# Patient Record
(demographics unavailable — no encounter records)

---
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
## 2024-11-05 NOTE — IMAGING
[No bony abnormalities] : No bony abnormalities [No spinal deformity, fracture, lytic lesion, or marked single level collapse] : No spinal deformity, fracture, lytic lesion, or marked single level collapse [No instability seen on flexion/extension] : No instability seen on flexion/extension [AP] : anteroposterior [There are no fractures, subluxations or dislocations. No significant abnormalities are seen] : There are no fractures, subluxations or dislocations. No significant abnormalities are seen
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
## 2025-01-28 NOTE — HISTORY OF PRESENT ILLNESS
[Spouse] : spouse [FreeTextEntry1] : new pt establish care annual physical [de-identified] : Mr. DE LA TORRE is a 52- year-old M pmhx of bilateral kidney stone, elevated PSA, Osteopenia, [place] who presents at the office today to establish care. Pt reports of snoring in the night, says he may have a deviated septum. Overall, pt feels well. No complaints. Denies chest pain, SOB, STALLINGS, dizziness, diaphoresis, palpitations, LE swelling, orthopnea, syncope, n/v, headache.

## 2025-01-28 NOTE — HEALTH RISK ASSESSMENT
[0] : 2) Feeling down, depressed, or hopeless: Not at all (0) [PHQ-2 Negative - No further assessment needed] : PHQ-2 Negative - No further assessment needed [Never] : Never [[id number]] : 0

## 2025-01-28 NOTE — HEALTH RISK ASSESSMENT
[0] : 2) Feeling down, depressed, or hopeless: Not at all (0) [PHQ-2 Negative - No further assessment needed] : PHQ-2 Negative - No further assessment needed [Never] : Never [[id number]] : 0

## 2025-01-28 NOTE — ADDENDUM
[FreeTextEntry1] : This note was written by Beth Valencia on 01/28/2025 acting as medical scribe for Dr. Mak Flowers. I, Dr. Mak Flowers, have read and attest that all the information, medical decision making and discharge instructions within are true and accurate.

## 2025-01-28 NOTE — HISTORY OF PRESENT ILLNESS
[Spouse] : spouse [FreeTextEntry1] : new pt establish care annual physical [de-identified] : Mr. DE LA TORRE is a 52- year-old M pmhx of bilateral kidney stone, elevated PSA, Osteopenia, [place] who presents at the office today to establish care. Pt reports of snoring in the night, says he may have a deviated septum. Overall, pt feels well. No complaints. Denies chest pain, SOB, STALLINGS, dizziness, diaphoresis, palpitations, LE swelling, orthopnea, syncope, n/v, headache.

## 2025-03-11 NOTE — HISTORY OF PRESENT ILLNESS
[FreeTextEntry1] : Dear Dr. Paul Haider,  Thank you so much for the referral to help care for your patient.  Chief Complaint: Elevated PSA Date of first visit: 1/5/2024 Pediatrician  CORRINA DE LA TORRE is a 53-year-old  man with PMHx nephrolithiasis (passed without intervention) and elevated PSA who presents for elevated PSA, BPH, and hx of kidney stones.  His previous urologist has retired. His PSA is 9.85 ng/ml, previous values ~ 4 ng/ml no as high as 14.8 ng 1/16/24. He has had an MRI in 2019 which he states demonstrated a 34cc prostate and no lesions. He has had one prior biopsy (prior to the 2019 MRI) which was negative. He denies family hx of prostate cancer. He underwent a targeted prostate biopsy with the UroNav MRI fusion on 2/8/24. The biopsy was NEGATIVE for cancer.  Experiencing mild LUTS started on alpha blockers but did not take them.   Patient with microscopic hematuria, Urine culture negative for infection. CT 2/2/24 multiple stones appreciated no masses appreciated. Patient has known renal stones. He follows up with Dr. Bender in [facility]. SFU low volume and low citrate on treatment.  Select MDx 09/04/2024 - Low Risk   PSA Hx 6.1 01/31/2025 psad 0.10 ng/ml/cc  Select Mdx 9/4/2024 low risk  7.89 06/07/2024 14.80 01/16/2024 9.85 12/28/23  CT Hx: CT abdomen and pelvis 02/02/2024- Bilateral Non obstructing nephrolithiasis. KIDNEYS/URETERS: Right renal stone in lower pole 9 mm. There are 3 left renal stones, each measuring 1 mm, 3 mm and 7 mm. No ureteral stones. Bilateral pelviectasis. No hydronephrosis. Symmetric nephrograms. No renal masses or evidence of a urothelial lesion.  Biopsy Hx: 02/08/2024 with Dr. Hernandez- -Benign  MRI Hx; MRI at [facility] on 01/19/2024. 59 cc prostate with PIRADS 2, No targetable MRI. No LAD No EPE, No Bony Lesions. The images have been reviewed and clinical implications discussed with the patient.  MRI at [facility] on 01/26/2024. 59 cc prostate, No LAD No EPE, No Bony Lesions. Left peripheral zone mid gland nodule, patchy dark on T2-weighted images (series 9 image 19) measuring 8 x 7 x 8 mm. The lesion demonstrate mild restricted diffusion and mild early enhancement. PIRAD 3. T2 dark nodule in the anterior central gland adjacent to the fibromuscular stroma at the level of the mid gland measuring 6 x 6 mm (series 9 image 19. The lesion demonstrates mild restricted diffusion and mild early arterial enhancement. PIRAD 3. The images have been reviewed and clinical implications discussed with the patient.  2019 MRI the left base lesion - there are some T2 changes.  03/11/2025 IPSS 5  QOL 3   09/04/2024 IPSS 3 QOL3 SANAM 21  02/21/2024 IPSS 8 QOL 3 SANAM 18  01/31/2024 IPSS 6 QOL 3 SHIM19  01/05/2024 IPSS 5 QOL 3 IIEF 21  Prostate cancer screening: the patient and I spoke at length about prostate cancer screening, its risks and its benefits. The patient has 2 (age, PSA) risk factors for prostate cancer. He understands that many men with prostate cancer will die with the disease rather than of it and we also discussed the results large multi-center American and  prostate cancer screening trials. He also understands that PSA in and of itself does not diagnose prostate cancer but only assesses risk to a certain degree. The patient understands that to definitively screen for prostate cancer, a biopsy is required, and this procedure has risks, including bleeding, infection, ED and urinary retention. The patient opted to now follow PSA after a negative fusion biopsy.  The patient denies fevers, chills, nausea and or vomiting and no unexplained weight loss.  All pertinent laboratory, films and physician notes were reviewed. Questionnaire results were discussed with patient.

## 2025-03-11 NOTE — HISTORY OF PRESENT ILLNESS
[FreeTextEntry1] : Dear Dr. Paul Haider,  Thank you so much for the referral to help care for your patient.  Chief Complaint: Elevated PSA Date of first visit: 1/5/2024 Pediatrician  CORRINA DE LA TORRE is a 53-year-old  man with PMHx nephrolithiasis (passed without intervention) and elevated PSA who presents for elevated PSA, BPH, and hx of kidney stones.  His previous urologist has retired. His PSA is 9.85 ng/ml, previous values ~ 4 ng/ml no as high as 14.8 ng 1/16/24. He has had an MRI in 2019 which he states demonstrated a 34cc prostate and no lesions. He has had one prior biopsy (prior to the 2019 MRI) which was negative. He denies family hx of prostate cancer. He underwent a targeted prostate biopsy with the UroNav MRI fusion on 2/8/24. The biopsy was NEGATIVE for cancer.  Experiencing mild LUTS started on alpha blockers but did not take them.   Patient with microscopic hematuria, Urine culture negative for infection. CT 2/2/24 multiple stones appreciated no masses appreciated. Patient has known renal stones. He follows up with Dr. Bender in [facility]. SFU low volume and low citrate on treatment.  Select MDx 09/04/2024 - Low Risk   PSA Hx 6.1 01/31/2025 psad 0.10 ng/ml/cc  Select Mdx 9/4/2024 low risk  7.89 06/07/2024 14.80 01/16/2024 9.85 12/28/23  CT Hx: CT abdomen and pelvis 02/02/2024- Bilateral Non obstructing nephrolithiasis. KIDNEYS/URETERS: Right renal stone in lower pole 9 mm. There are 3 left renal stones, each measuring 1 mm, 3 mm and 7 mm. No ureteral stones. Bilateral pelviectasis. No hydronephrosis. Symmetric nephrograms. No renal masses or evidence of a urothelial lesion.  Biopsy Hx: 02/08/2024 with Dr. Hernandez- -Benign  MRI Hx; MRI at [facility] on 01/19/2024. 59 cc prostate with PIRADS 2, No targetable MRI. No LAD No EPE, No Bony Lesions. The images have been reviewed and clinical implications discussed with the patient.  MRI at [facility] on 01/26/2024. 59 cc prostate, No LAD No EPE, No Bony Lesions. Left peripheral zone mid gland nodule, patchy dark on T2-weighted images (series 9 image 19) measuring 8 x 7 x 8 mm. The lesion demonstrate mild restricted diffusion and mild early enhancement. PIRAD 3. T2 dark nodule in the anterior central gland adjacent to the fibromuscular stroma at the level of the mid gland measuring 6 x 6 mm (series 9 image 19. The lesion demonstrates mild restricted diffusion and mild early arterial enhancement. PIRAD 3. The images have been reviewed and clinical implications discussed with the patient.  2019 MRI the left base lesion - there are some T2 changes.  03/11/2025 IPSS 5  QOL 3   09/04/2024 IPSS 3 QOL3 SANAM 21  02/21/2024 IPSS 8 QOL 3 SANAM 18  01/31/2024 IPSS 6 QOL 3 SHIM19  01/05/2024 IPSS 5 QOL 3 IIEF 21  Prostate cancer screening: the patient and I spoke at length about prostate cancer screening, its risks and its benefits. The patient has 2 (age, PSA) risk factors for prostate cancer. He understands that many men with prostate cancer will die with the disease rather than of it and we also discussed the results large multi-center American and  prostate cancer screening trials. He also understands that PSA in and of itself does not diagnose prostate cancer but only assesses risk to a certain degree. The patient understands that to definitively screen for prostate cancer, a biopsy is required, and this procedure has risks, including bleeding, infection, ED and urinary retention. The patient opted to now follow PSA after a negative fusion biopsy.  The patient denies fevers, chills, nausea and or vomiting and no unexplained weight loss.  All pertinent laboratory, films and physician notes were reviewed. Questionnaire results were discussed with patient.

## 2025-03-11 NOTE — ASSESSMENT
[FreeTextEntry1] : 54 yo male with elevated PSA up to 14.8 ng/ml, negative MRI in 2019 per patient, negative biopsy prior to 2019 MRI, neg FH. MRI showed PIRADS 3 lesions.  1. Elevated PSA  -Negative Biopsy 2/8/24 - PSA 6/7/2024 7.89 decreased ~ 50% - Select Mdx 9/4/2024 low risk  - PSA 6.1 2/25 - MRI 2027 - PSA every 6 months  2. Urine culture 1/16/24 negative for infection, +RBCs, CT already scheduled, will follow up with Dr. Bender in [facility] (** UPDATE 2/8/24 CTU resulted- bilateral stones largest 9mm, still recommend cysto given 190 RBC/hpf on UA.  (non smoker, low risk for TCC and known stones)  - stone former work up completed (Jan 2024).  - recommended increase hydration and potassium citrate  - following up with nephrologist in [facility]  - no gross hematuria - Litholyte recommended because he has trouble swallowing pills. - New CT in [facility] bilateral 9mm stones - Patient considering ureteroscopy at [facility], see Dr Urias too  3. BPH  - Uroxatral, did not take but will try now. new script sent  4. ED - SANAM 18  - no CP, no angina, no dyspnea on exertion  - Based on the patient's history, he was prescribed Sildenafil.  I, Dr Shankar, interventional urology fellow, saw and examined the patient with Dr Hernandez and finalized the plan with him  Thank you very much for allowing me to assist in the care of this patient. Please do not hesitate to contact me with any additional questions or concerns.     Sincerely,     Hero Hernandez D.O. Professor of Urology and Radiology  of Urology at [facility] Director for Prostate Cancer [address] [facility], [facility] Phone: [phone]

## 2025-03-11 NOTE — HISTORY OF PRESENT ILLNESS
[FreeTextEntry1] : Dear Dr. Paul Haider,  Thank you so much for the referral to help care for your patient.  Chief Complaint: Elevated PSA Date of first visit: 1/5/2024 Pediatrician  CORRINA DE LA TORRE is a 53-year-old  man with PMHx nephrolithiasis (passed without intervention) and elevated PSA who presents for elevated PSA, BPH, and hx of kidney stones.  His previous urologist has retired. His PSA is 9.85 ng/ml, previous values ~ 4 ng/ml no as high as 14.8 ng 1/16/24. He has had an MRI in 2019 which he states demonstrated a 34cc prostate and no lesions. He has had one prior biopsy (prior to the 2019 MRI) which was negative. He denies family hx of prostate cancer. He underwent a targeted prostate biopsy with the UroNav MRI fusion on 2/8/24. The biopsy was NEGATIVE for cancer.  Experiencing mild LUTS started on alpha blockers but did not take them.   Patient with microscopic hematuria, Urine culture negative for infection. CT 2/2/24 multiple stones appreciated no masses appreciated. Patient has known renal stones. He follows up with Dr. Bender in [facility]. SFU low volume and low citrate on treatment.  Select MDx 09/04/2024 - Low Risk   PSA Hx 6.1 01/31/2025 psad 0.10 ng/ml/cc  Select Mdx 9/4/2024 low risk  7.89 06/07/2024 14.80 01/16/2024 9.85 12/28/23  CT Hx: CT abdomen and pelvis 02/02/2024- Bilateral Non obstructing nephrolithiasis. KIDNEYS/URETERS: Right renal stone in lower pole 9 mm. There are 3 left renal stones, each measuring 1 mm, 3 mm and 7 mm. No ureteral stones. Bilateral pelviectasis. No hydronephrosis. Symmetric nephrograms. No renal masses or evidence of a urothelial lesion.  Biopsy Hx: 02/08/2024 with Dr. Hernandez- -Benign  MRI Hx; MRI at [facility] on 01/19/2024. 59 cc prostate with PIRADS 2, No targetable MRI. No LAD No EPE, No Bony Lesions. The images have been reviewed and clinical implications discussed with the patient.  MRI at [facility] on 01/26/2024. 59 cc prostate, No LAD No EPE, No Bony Lesions. Left peripheral zone mid gland nodule, patchy dark on T2-weighted images (series 9 image 19) measuring 8 x 7 x 8 mm. The lesion demonstrate mild restricted diffusion and mild early enhancement. PIRAD 3. T2 dark nodule in the anterior central gland adjacent to the fibromuscular stroma at the level of the mid gland measuring 6 x 6 mm (series 9 image 19. The lesion demonstrates mild restricted diffusion and mild early arterial enhancement. PIRAD 3. The images have been reviewed and clinical implications discussed with the patient.  2019 MRI the left base lesion - there are some T2 changes.  03/11/2025 IPSS 5  QOL 3   09/04/2024 IPSS 3 QOL3 SANAM 21  02/21/2024 IPSS 8 QOL 3 SANAM 18  01/31/2024 IPSS 6 QOL 3 SHIM19  01/05/2024 IPSS 5 QOL 3 IIEF 21  Prostate cancer screening: the patient and I spoke at length about prostate cancer screening, its risks and its benefits. The patient has 2 (age, PSA) risk factors for prostate cancer. He understands that many men with prostate cancer will die with the disease rather than of it and we also discussed the results large multi-center American and  prostate cancer screening trials. He also understands that PSA in and of itself does not diagnose prostate cancer but only assesses risk to a certain degree. The patient understands that to definitively screen for prostate cancer, a biopsy is required, and this procedure has risks, including bleeding, infection, ED and urinary retention. The patient opted to now follow PSA after a negative fusion biopsy.  The patient denies fevers, chills, nausea and or vomiting and no unexplained weight loss.  All pertinent laboratory, films and physician notes were reviewed. Questionnaire results were discussed with patient.

## 2025-03-11 NOTE — ASSESSMENT
[FreeTextEntry1] : 54 yo male with elevated PSA up to 14.8 ng/ml, negative MRI in 2019 per patient, negative biopsy prior to 2019 MRI, neg FH. MRI showed PIRADS 3 lesions.  1. Elevated PSA  -Negative Biopsy 2/8/24 - PSA 6/7/2024 7.89 decreased ~ 50% - Select Mdx 9/4/2024 low risk  - PSA 6.1 2/25 - MRI 2027 - PSA every 6 months  2. Urine culture 1/16/24 negative for infection, +RBCs, CT already scheduled, will follow up with Dr. Bender in [facility] (** UPDATE 2/8/24 CTU resulted- bilateral stones largest 9mm, still recommend cysto given 190 RBC/hpf on UA.  (non smoker, low risk for TCC and known stones)  - stone former work up completed (Jan 2024).  - recommended increase hydration and potassium citrate  - following up with nephrologist in [facility]  - no gross hematuria - Litholyte recommended because he has trouble swallowing pills. - New CT in [facility] bilateral 9mm stones - Patient considering ureteroscopy at [facility], see Dr Urias too  3. BPH  - Uroxatral, did not take but will try now. new script sent  4. ED - SANAM 18  - no CP, no angina, no dyspnea on exertion  - Based on the patient's history, he was prescribed Sildenafil.  I, Dr Shankar, interventional urology fellow, saw and examined the patient with Dr Hernandez and finalized the plan with him  Thank you very much for allowing me to assist in the care of this patient. Please do not hesitate to contact me with any additional questions or concerns.     Sincerely,     Hero Hernandez D.O. Professor of Urology and Radiology  of Urology at [facility] Director for Prostate Cancer [address] [facility], [facility] Phone: [phone]

## 2025-03-11 NOTE — ASSESSMENT
[FreeTextEntry1] : 52 yo male with elevated PSA up to 14.8 ng/ml, negative MRI in 2019 per patient, negative biopsy prior to 2019 MRI, neg FH. MRI showed PIRADS 3 lesions.  1. Elevated PSA  -Negative Biopsy 2/8/24 - PSA 6/7/2024 7.89 decreased ~ 50% - Select Mdx 9/4/2024 low risk  - PSA 6.1 2/25 - MRI 2027 - PSA every 6 months  2. Urine culture 1/16/24 negative for infection, +RBCs, CT already scheduled, will follow up with Dr. Bender in [facility] (** UPDATE 2/8/24 CTU resulted- bilateral stones largest 9mm, still recommend cysto given 190 RBC/hpf on UA.  (non smoker, low risk for TCC and known stones)  - stone former work up completed (Jan 2024).  - recommended increase hydration and potassium citrate  - following up with nephrologist in [facility]  - no gross hematuria - Litholyte recommended because he has trouble swallowing pills. - New CT in [facility] bilateral 9mm stones - Patient considering ureteroscopy at [facility], see Dr Urias too  3. BPH  - Uroxatral, did not take but will try now. new script sent  4. ED - SANAM 18  - no CP, no angina, no dyspnea on exertion  - Based on the patient's history, he was prescribed Sildenafil.  I, Dr Shankar, interventional urology fellow, saw and examined the patient with Dr Hernandez and finalized the plan with him  Thank you very much for allowing me to assist in the care of this patient. Please do not hesitate to contact me with any additional questions or concerns.     Sincerely,     Hero Hernandez D.O. Professor of Urology and Radiology  of Urology at [facility] Director for Prostate Cancer [address] [facility], [facility] Phone: [phone]
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
## 2025-03-18 NOTE — ASSESSMENT
[FreeTextEntry1] : #rash  uncertain diagnosis of uncertain prognosis favor idiosyncratic reaction to viral illnesses unlikely autoinflammatory d/o like Still's/Schnitzlers but can obtain labs as below in case will call with results consider GR presentation, pt amenable to virtual presentation

## 2025-03-18 NOTE — PHYSICAL EXAM
[Alert] : alert [Oriented x 3] : ~L oriented x 3 [Well Nourished] : well nourished [Conjunctiva Non-injected] : conjunctiva non-injected [No Visual Lymphadenopathy] : no visual  lymphadenopathy [No Clubbing] : no clubbing [No Edema] : no edema [No Bromhidrosis] : no bromhidrosis [No Chromhidrosis] : no chromhidrosis [FreeTextEntry3] : erythematous coalescing macules on scalp, temples, neck, back, chest inguinal folds clear today
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
## 2025-07-03 NOTE — HISTORY OF PRESENT ILLNESS
[FreeTextEntry1] : wt loss [de-identified] : First visit-  wt loss 8 lbs 1.5 mos. appet ok. has sons wedding in a few weeks. claims not to be stressed about it.  saw gi- mr enterography neg, saw dr mendoza douglas- last colon polyp 2024 fodmap diet effective for gas- sibo? recent thirst- last night.  ct abd lita bilat stones.  no recent dental work.  mood ok.  no hyperthyroid signs.  no dm signs.  no recent travel. nl bms past 2 wks- occasional constipation.  Reviewed all interim as well as relevant prior consultations, labs and radiological studies.

## 2025-07-03 NOTE — PHYSICAL EXAM
[No Acute Distress] : no acute distress [Well Nourished] : well nourished [Well Developed] : well developed [Well-Appearing] : well-appearing [Normal Sclera/Conjunctiva] : normal sclera/conjunctiva [PERRL] : pupils equal round and reactive to light [EOMI] : extraocular movements intact [Normal Outer Ear/Nose] : the outer ears and nose were normal in appearance [Normal Oropharynx] : the oropharynx was normal [No JVD] : no jugular venous distention [No Lymphadenopathy] : no lymphadenopathy [Supple] : supple [Thyroid Normal, No Nodules] : the thyroid was normal and there were no nodules present [No Respiratory Distress] : no respiratory distress  [No Accessory Muscle Use] : no accessory muscle use [Clear to Auscultation] : lungs were clear to auscultation bilaterally [Normal Rate] : normal rate  [Regular Rhythm] : with a regular rhythm [Normal S1, S2] : normal S1 and S2 [No Murmur] : no murmur heard [No Carotid Bruits] : no carotid bruits [No Abdominal Bruit] : a ~M bruit was not heard ~T in the abdomen [No Varicosities] : no varicosities [Pedal Pulses Present] : the pedal pulses are present [No Edema] : there was no peripheral edema [No Palpable Aorta] : no palpable aorta [No Extremity Clubbing/Cyanosis] : no extremity clubbing/cyanosis [Soft] : abdomen soft [Non Tender] : non-tender [Non-distended] : non-distended [No Masses] : no abdominal mass palpated [No HSM] : no HSM [Normal Bowel Sounds] : normal bowel sounds [Normal Posterior Cervical Nodes] : no posterior cervical lymphadenopathy [Normal Anterior Cervical Nodes] : no anterior cervical lymphadenopathy [No CVA Tenderness] : no CVA  tenderness [No Spinal Tenderness] : no spinal tenderness [No Joint Swelling] : no joint swelling [Grossly Normal Strength/Tone] : grossly normal strength/tone [No Rash] : no rash [Coordination Grossly Intact] : coordination grossly intact [No Focal Deficits] : no focal deficits [Normal Gait] : normal gait [Deep Tendon Reflexes (DTR)] : deep tendon reflexes were 2+ and symmetric [Normal Affect] : the affect was normal [Normal Insight/Judgement] : insight and judgment were intact [de-identified] : no hernias

## 2025-07-03 NOTE — HISTORY OF PRESENT ILLNESS
[FreeTextEntry1] : wt loss [de-identified] : First visit-  wt loss 8 lbs 1.5 mos. appet ok. has sons wedding in a few weeks. claims not to be stressed about it.  saw gi- mr enterography neg, saw dr mendoza douglas- last colon polyp 2024 fodmap diet effective for gas- sibo? recent thirst- last night.  ct abd lita bilat stones.  no recent dental work.  mood ok.  no hyperthyroid signs.  no dm signs.  no recent travel. nl bms past 2 wks- occasional constipation.  Reviewed all interim as well as relevant prior consultations, labs and radiological studies.

## 2025-07-03 NOTE — ASSESSMENT
[FreeTextEntry1] : wt loss. - 8 lbs in 1.5 mos , represents a 6% wt loss without any obvious GI symptoms aside from his usual, mild ibs-c type symptoms and fairly recent neg gi eval including MR josephine, colonoscopy. family stress(wedding),political stress( was in [place]) could be playing a role. will evaluate for other underlying causes including metabolic and oncologic. recent ct a/p without contrast was remarkable only for bilat kidney stones, for which he is already seeing urol.  f/u with gi f/u in 6-8 wks.  monitor appet, bms weight weekly.  Pt. was encouraged to do all relevant screening tests including but not limited to colonoscopy, annual psa, annual skin exam and annual CT chest if smoker and meets criteria  Discussed the importance and benefit of a healthy lifestyle including a heart healthy diet such as the Mediterranean diet and regular exercise as well as 7 hours of sleep nightly.

## 2025-07-03 NOTE — PHYSICAL EXAM
[No Acute Distress] : no acute distress [Well Nourished] : well nourished [Well Developed] : well developed [Well-Appearing] : well-appearing [Normal Sclera/Conjunctiva] : normal sclera/conjunctiva [PERRL] : pupils equal round and reactive to light [EOMI] : extraocular movements intact [Normal Outer Ear/Nose] : the outer ears and nose were normal in appearance [Normal Oropharynx] : the oropharynx was normal [No JVD] : no jugular venous distention [No Lymphadenopathy] : no lymphadenopathy [Supple] : supple [Thyroid Normal, No Nodules] : the thyroid was normal and there were no nodules present [No Respiratory Distress] : no respiratory distress  [No Accessory Muscle Use] : no accessory muscle use [Clear to Auscultation] : lungs were clear to auscultation bilaterally [Normal Rate] : normal rate  [Regular Rhythm] : with a regular rhythm [Normal S1, S2] : normal S1 and S2 [No Murmur] : no murmur heard [No Carotid Bruits] : no carotid bruits [No Abdominal Bruit] : a ~M bruit was not heard ~T in the abdomen [No Varicosities] : no varicosities [Pedal Pulses Present] : the pedal pulses are present [No Edema] : there was no peripheral edema [No Palpable Aorta] : no palpable aorta [No Extremity Clubbing/Cyanosis] : no extremity clubbing/cyanosis [Soft] : abdomen soft [Non Tender] : non-tender [Non-distended] : non-distended [No Masses] : no abdominal mass palpated [No HSM] : no HSM [Normal Bowel Sounds] : normal bowel sounds [Normal Posterior Cervical Nodes] : no posterior cervical lymphadenopathy [Normal Anterior Cervical Nodes] : no anterior cervical lymphadenopathy [No CVA Tenderness] : no CVA  tenderness [No Spinal Tenderness] : no spinal tenderness [No Joint Swelling] : no joint swelling [Grossly Normal Strength/Tone] : grossly normal strength/tone [No Rash] : no rash [Coordination Grossly Intact] : coordination grossly intact [No Focal Deficits] : no focal deficits [Normal Gait] : normal gait [Deep Tendon Reflexes (DTR)] : deep tendon reflexes were 2+ and symmetric [Normal Affect] : the affect was normal [Normal Insight/Judgement] : insight and judgment were intact [de-identified] : no hernias